# Patient Record
Sex: FEMALE | Race: WHITE
[De-identification: names, ages, dates, MRNs, and addresses within clinical notes are randomized per-mention and may not be internally consistent; named-entity substitution may affect disease eponyms.]

---
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
## 2025-01-11 ENCOUNTER — HOSPITAL ENCOUNTER (OUTPATIENT)
Dept: SLEEP CENTER | Age: 20
Discharge: HOME | End: 2025-01-11
Payer: COMMERCIAL

## 2025-01-11 DIAGNOSIS — G47.33 OSA (OBSTRUCTIVE SLEEP APNEA): ICD-10-CM

## 2025-01-11 PROCEDURE — 95810 POLYSOM 6/> YRS 4/> PARAM: CPT

## 2025-01-12 VITALS
HEART RATE: 74 BPM | HEIGHT: 67 IN | WEIGHT: 293 LBS | BODY MASS INDEX: 45.99 KG/M2 | DIASTOLIC BLOOD PRESSURE: 94 MMHG | SYSTOLIC BLOOD PRESSURE: 188 MMHG | OXYGEN SATURATION: 99 %

## 2025-01-12 ASSESSMENT — SLEEP AND FATIGUE QUESTIONNAIRES
HOW LIKELY ARE YOU TO NOD OFF OR FALL ASLEEP WHILE SITTING QUIETLY AFTER LUNCH WITHOUT ALCOHOL: HIGH CHANCE OF DOZING
HOW LIKELY ARE YOU TO NOD OFF OR FALL ASLEEP IN A CAR, WHILE STOPPED FOR A FEW MINUTES IN TRAFFIC: WOULD NEVER DOZE
HOW LIKELY ARE YOU TO NOD OFF OR FALL ASLEEP WHILE SITTING AND TALKING TO SOMEONE: MODERATE CHANCE OF DOZING
HOW LIKELY ARE YOU TO NOD OFF OR FALL ASLEEP WHILE WATCHING TV: HIGH CHANCE OF DOZING
HOW LIKELY ARE YOU TO NOD OFF OR FALL ASLEEP WHILE SITTING INACTIVE IN A PUBLIC PLACE: MODERATE CHANCE OF DOZING
HOW LIKELY ARE YOU TO NOD OFF OR FALL ASLEEP WHILE SITTING AND READING: HIGH CHANCE OF DOZING
HOW LIKELY ARE YOU TO NOD OFF OR FALL ASLEEP WHEN YOU ARE A PASSENGER IN A CAR FOR AN HOUR WITHOUT A BREAK: HIGH CHANCE OF DOZING
ESS TOTAL SCORE: 19
HOW LIKELY ARE YOU TO NOD OFF OR FALL ASLEEP WHILE LYING DOWN TO REST IN THE AFTERNOON WHEN CIRCUMSTANCES PERMIT: HIGH CHANCE OF DOZING

## 2025-02-16 DIAGNOSIS — G47.33 OSA (OBSTRUCTIVE SLEEP APNEA): Primary | ICD-10-CM

## 2025-02-17 ENCOUNTER — TELEPHONE (OUTPATIENT)
Dept: SLEEP CENTER | Age: 20
End: 2025-02-17

## 2025-03-11 ENCOUNTER — HOSPITAL ENCOUNTER (OUTPATIENT)
Dept: SLEEP CENTER | Age: 20
Discharge: HOME | End: 2025-03-11
Payer: COMMERCIAL

## 2025-03-11 DIAGNOSIS — G47.33 OSA (OBSTRUCTIVE SLEEP APNEA): ICD-10-CM

## 2025-03-11 PROCEDURE — 95800 SLP STDY UNATTENDED: CPT

## 2025-04-25 DIAGNOSIS — G47.33 OSA (OBSTRUCTIVE SLEEP APNEA): Primary | ICD-10-CM

## 2025-04-28 ENCOUNTER — TELEPHONE (OUTPATIENT)
Dept: SLEEP CENTER | Age: 20
End: 2025-04-28

## 2025-04-28 NOTE — TELEPHONE ENCOUNTER
Return call from pt/grandmother discussed SS results and new order for titration study per her insurance to obtain a cpap. Pt agreeable.

## 2025-05-22 ENCOUNTER — HOSPITAL ENCOUNTER (OUTPATIENT)
Dept: SLEEP CENTER | Age: 20
Discharge: HOME | End: 2025-05-22
Payer: COMMERCIAL

## 2025-05-22 DIAGNOSIS — G47.33 OSA (OBSTRUCTIVE SLEEP APNEA): ICD-10-CM

## 2025-05-22 PROCEDURE — 95811 POLYSOM 6/>YRS CPAP 4/> PARM: CPT

## 2025-05-23 VITALS — HEIGHT: 67 IN | WEIGHT: 293 LBS | BODY MASS INDEX: 45.99 KG/M2 | HEART RATE: 71 BPM | OXYGEN SATURATION: 97 %

## 2025-07-08 ENCOUNTER — TELEPHONE (OUTPATIENT)
Dept: SLEEP CENTER | Age: 20
End: 2025-07-08

## 2025-07-10 ENCOUNTER — TELEMEDICINE (OUTPATIENT)
Dept: SLEEP MEDICINE | Age: 20
End: 2025-07-10
Payer: COMMERCIAL

## 2025-07-10 DIAGNOSIS — G47.33 OSA (OBSTRUCTIVE SLEEP APNEA): ICD-10-CM

## 2025-07-10 DIAGNOSIS — G47.10 HYPERSOMNOLENCE: Primary | ICD-10-CM

## 2025-07-10 PROCEDURE — 99214 OFFICE O/P EST MOD 30 MIN: CPT | Performed by: STUDENT IN AN ORGANIZED HEALTH CARE EDUCATION/TRAINING PROGRAM

## 2025-07-10 NOTE — PROGRESS NOTES
Felicitas Henriquez, was evaluated through a synchronous (real-time) audio-video encounter. The patient (or guardian if applicable) is aware that this is a billable service, which includes applicable co-pays. This Virtual Visit was conducted with patient's (and/or legal guardian's) consent. Patient identification was verified, and a caregiver was present when appropriate.   The patient was located at Home: [address] [place], [place] [address] [address]  Provider was located at Facility (Appt Dept): [address] [place] [address]  Confirm you are appropriately licensed, registered, or certified to deliver care in the state where the patient is located as indicated above. If you are not or unsure, please re-schedule the visit: Yes, I confirm.     Felicitas Henriquez (:  2005) is a Established patient, presenting virtually for evaluation of the following:      Below is the assessment and plan developed based on review of pertinent history, physical exam, labs, studies, and medications.     Assessment & Plan  Hypersomnolence   Chronic, not at goal (unstable), pursue evaluation for narcolepsy/IH while on CPAP therapy      Orders:    Baseline Diagnostic Sleep Study; Future    Assessment of Daytime Sleepiness; Future    DME Order for CPAP as OP    EPIFANIO (obstructive sleep apnea)  Discussed sleep study results, emphasized severity of study and diagnosis including possible clinical sequelae of microvascular disease including higher incidence of stroke, atrial fibrillation, hypertension, or other cognitive microvascular damage. Counseled on appropriate use of the device, such as using distilled water, daily cleaning of mask and tube with gentle soap and water or non-toxic wipes. Counseled on troubleshooting device (such as rainout) and being aware of the ambient humidity of the room.      Orders:    DME Order for CPAP as OP      No follow-ups on file.       Subjective     - Here to discusss sleep study results  -

## 2025-08-12 ENCOUNTER — HOSPITAL ENCOUNTER (OUTPATIENT)
Dept: SLEEP CENTER | Age: 20
Discharge: HOME | End: 2025-08-12
Payer: COMMERCIAL

## 2025-08-12 DIAGNOSIS — G47.10 HYPERSOMNOLENCE: ICD-10-CM

## 2025-08-12 PROCEDURE — 95810 POLYSOM 6/> YRS 4/> PARAM: CPT

## 2025-08-13 ENCOUNTER — HOSPITAL ENCOUNTER (OUTPATIENT)
Dept: SLEEP CENTER | Age: 20
Discharge: HOME | End: 2025-08-13
Payer: COMMERCIAL

## 2025-08-13 VITALS — BODY MASS INDEX: 45.99 KG/M2 | HEIGHT: 67 IN | HEART RATE: 68 BPM | WEIGHT: 293 LBS | OXYGEN SATURATION: 97 %

## 2025-08-13 DIAGNOSIS — G47.10 HYPERSOMNOLENCE: ICD-10-CM

## 2025-08-13 DIAGNOSIS — G47.10 HYPERSOMNOLENCE: Primary | ICD-10-CM

## 2025-08-13 PROCEDURE — 95805 MULTIPLE SLEEP LATENCY TEST: CPT

## 2025-08-13 ASSESSMENT — SLEEP AND FATIGUE QUESTIONNAIRES
HOW LIKELY ARE YOU TO NOD OFF OR FALL ASLEEP WHILE SITTING QUIETLY AFTER LUNCH WITHOUT ALCOHOL: SLIGHT CHANCE OF DOZING
HOW LIKELY ARE YOU TO NOD OFF OR FALL ASLEEP WHILE SITTING AND READING: MODERATE CHANCE OF DOZING
HOW LIKELY ARE YOU TO NOD OFF OR FALL ASLEEP WHILE WATCHING TV: MODERATE CHANCE OF DOZING
HOW LIKELY ARE YOU TO NOD OFF OR FALL ASLEEP IN A CAR, WHILE STOPPED FOR A FEW MINUTES IN TRAFFIC: WOULD NEVER DOZE
ESS TOTAL SCORE: 9
HOW LIKELY ARE YOU TO NOD OFF OR FALL ASLEEP WHILE LYING DOWN TO REST IN THE AFTERNOON WHEN CIRCUMSTANCES PERMIT: HIGH CHANCE OF DOZING
HOW LIKELY ARE YOU TO NOD OFF OR FALL ASLEEP WHEN YOU ARE A PASSENGER IN A CAR FOR AN HOUR WITHOUT A BREAK: SLIGHT CHANCE OF DOZING
HOW LIKELY ARE YOU TO NOD OFF OR FALL ASLEEP WHILE SITTING INACTIVE IN A PUBLIC PLACE: WOULD NEVER DOZE
HOW LIKELY ARE YOU TO NOD OFF OR FALL ASLEEP WHILE SITTING AND TALKING TO SOMEONE: WOULD NEVER DOZE

## 2025-08-14 DIAGNOSIS — G47.10 HYPERSOMNOLENCE: ICD-10-CM

## 2025-08-14 LAB
AMPHETAMINE SCREEN URINE: NEGATIVE
BARBITURATE SCREEN URINE: NEGATIVE
BENZODIAZEPINE SCREEN, URINE: NEGATIVE
BUPRENORPHINE URINE: NEGATIVE
CANNABINOID SCREEN URINE: NEGATIVE
COCAINE METABOLITE, URINE: NEGATIVE
FENTANYL URINE: NEGATIVE
METHADONE SCREEN, URINE: NEGATIVE
OPIATES, URINE: NEGATIVE
OXYCODONE SCREEN URINE: NEGATIVE
PCP,URINE: NEGATIVE
TEST INFORMATION: NORMAL